# Patient Record
Sex: FEMALE | Race: WHITE | Employment: FULL TIME | URBAN - METROPOLITAN AREA
[De-identification: names, ages, dates, MRNs, and addresses within clinical notes are randomized per-mention and may not be internally consistent; named-entity substitution may affect disease eponyms.]

---

## 2018-07-16 PROBLEM — Z34.90 PREGNANCY: Status: ACTIVE | Noted: 2018-07-16

## 2018-07-19 PROBLEM — O09.899 RUBELLA NON-IMMUNE STATUS, ANTEPARTUM: Status: ACTIVE | Noted: 2018-07-19

## 2018-07-19 PROBLEM — Z28.39 RUBELLA NON-IMMUNE STATUS, ANTEPARTUM: Status: ACTIVE | Noted: 2018-07-19

## 2019-01-26 ENCOUNTER — ANESTHESIA EVENT (OUTPATIENT)
Dept: LABOR AND DELIVERY | Age: 29
End: 2019-01-26
Payer: COMMERCIAL

## 2019-01-26 ENCOUNTER — ANESTHESIA (OUTPATIENT)
Dept: LABOR AND DELIVERY | Age: 29
End: 2019-01-26
Payer: COMMERCIAL

## 2019-01-26 ENCOUNTER — HOSPITAL ENCOUNTER (INPATIENT)
Age: 29
LOS: 2 days | Discharge: HOME | End: 2019-01-28
Attending: OBSTETRICS & GYNECOLOGY | Admitting: OBSTETRICS & GYNECOLOGY
Payer: COMMERCIAL

## 2019-01-26 PROBLEM — O42.00 PREMATURE RUPTURE OF MEMBRANES WITH ONSET OF LABOR WITHIN 24 HOURS OF RUPTURE: Status: ACTIVE | Noted: 2019-01-26

## 2019-01-26 PROBLEM — O26.893 ABDOMINAL PAIN DURING PREGNANCY, THIRD TRIMESTER: Status: ACTIVE | Noted: 2019-01-26

## 2019-01-26 PROBLEM — R10.9 ABDOMINAL PAIN DURING PREGNANCY, THIRD TRIMESTER: Status: ACTIVE | Noted: 2019-01-26

## 2019-01-26 LAB
A1 MICROGLOB PLACENTAL VAG QL: POSITIVE
ALBUMIN SERPL-MCNC: 2.5 G/DL (ref 3.5–5)
ALBUMIN/GLOB SERPL: 0.7 {RATIO}
ALP SERPL-CCNC: 221 U/L (ref 50–136)
ALT SERPL-CCNC: 22 U/L (ref 12–65)
ANION GAP SERPL CALC-SCNC: 8 MMOL/L
ARTERIAL PATENCY WRIST A: ABNORMAL
ARTERIAL PATENCY WRIST A: ABNORMAL
AST SERPL-CCNC: 27 U/L (ref 15–37)
BASE DEFICIT BLD-SCNC: 7 MMOL/L
BASE DEFICIT BLD-SCNC: 8 MMOL/L
BDY SITE: ABNORMAL
BDY SITE: ABNORMAL
BILIRUB SERPL-MCNC: 0.4 MG/DL (ref 0.2–1.1)
BODY TEMPERATURE: 98.6
BODY TEMPERATURE: 98.6
BUN SERPL-MCNC: 6 MG/DL (ref 6–23)
CALCIUM SERPL-MCNC: 8.7 MG/DL (ref 8.3–10.4)
CHLORIDE SERPL-SCNC: 107 MMOL/L (ref 98–107)
CO2 BLD-SCNC: 22 MMOL/L
CO2 BLD-SCNC: 26 MMOL/L
CO2 SERPL-SCNC: 23 MMOL/L (ref 21–32)
COLLECT TIME,HTIME: 2239
COLLECT TIME,HTIME: 2239
CONTROL LINE PRESENT?: NORMAL
CREAT SERPL-MCNC: 0.65 MG/DL (ref 0.6–1)
ERYTHROCYTE [DISTWIDTH] IN BLOOD BY AUTOMATED COUNT: 13.5 % (ref 11.9–14.6)
EXPIRATION DATE: NORMAL
GAS FLOW.O2 O2 DELIVERY SYS: ABNORMAL L/MIN
GAS FLOW.O2 O2 DELIVERY SYS: ABNORMAL L/MIN
GLOBULIN SER CALC-MCNC: 3.8 G/DL (ref 2.3–3.5)
GLUCOSE SERPL-MCNC: 75 MG/DL (ref 65–100)
HCO3 BLD-SCNC: 23.5 MMOL/L (ref 22–26)
HCO3 BLDV-SCNC: 20.4 MMOL/L (ref 23–28)
HCT VFR BLD AUTO: 39.7 % (ref 35.8–46.3)
HGB BLD-MCNC: 12.5 G/DL (ref 11.7–15.4)
INTERNAL NEGATIVE CONTROL: NORMAL
KIT LOT NO.: NORMAL
MCH RBC QN AUTO: 28 PG (ref 26.1–32.9)
MCHC RBC AUTO-ENTMCNC: 31.5 G/DL (ref 31.4–35)
MCV RBC AUTO: 88.8 FL (ref 79.6–97.8)
NRBC # BLD: 0 K/UL (ref 0–0.2)
O2/TOTAL GAS SETTING VFR VENT: 21 %
O2/TOTAL GAS SETTING VFR VENT: 21 %
PCO2 BLDCO: 45 MMHG (ref 32–68)
PCO2 BLDCO: 74 MMHG (ref 32–68)
PH BLDCO: 7.11 [PH] (ref 7.15–7.38)
PH BLDCO: 7.26 [PH] (ref 7.15–7.38)
PLATELET # BLD AUTO: 199 K/UL (ref 150–450)
PMV BLD AUTO: 11.4 FL (ref 9.4–12.3)
PO2 BLDCO: 26 MMHG
PO2 BLDCO: 29 MMHG
POTASSIUM SERPL-SCNC: 4.2 MMOL/L (ref 3.5–5.1)
PROT SERPL-MCNC: 6.3 G/DL
RBC # BLD AUTO: 4.47 M/UL (ref 4.05–5.2)
SAO2 % BLD: 29 % (ref 95–98)
SAO2 % BLDV: 46 % (ref 65–88)
SERVICE CMNT-IMP: ABNORMAL
SODIUM SERPL-SCNC: 138 MMOL/L (ref 136–145)
SPECIMEN TYPE: ABNORMAL
SPECIMEN TYPE: ABNORMAL
URATE SERPL-MCNC: 5.5 MG/DL (ref 2.6–6)
WBC # BLD AUTO: 12.3 K/UL (ref 4.3–11.1)

## 2019-01-26 PROCEDURE — 85027 COMPLETE CBC AUTOMATED: CPT

## 2019-01-26 PROCEDURE — 77030011945 HC CATH URIN INT ST MENT -A

## 2019-01-26 PROCEDURE — 84550 ASSAY OF BLOOD/URIC ACID: CPT

## 2019-01-26 PROCEDURE — 4A1HXCZ MONITORING OF PRODUCTS OF CONCEPTION, CARDIAC RATE, EXTERNAL APPROACH: ICD-10-PCS | Performed by: OBSTETRICS & GYNECOLOGY

## 2019-01-26 PROCEDURE — 77030011943

## 2019-01-26 PROCEDURE — A4300 CATH IMPL VASC ACCESS PORTAL: HCPCS | Performed by: ANESTHESIOLOGY

## 2019-01-26 PROCEDURE — 86900 BLOOD TYPING SEROLOGIC ABO: CPT

## 2019-01-26 PROCEDURE — 74011250637 HC RX REV CODE- 250/637: Performed by: OBSTETRICS & GYNECOLOGY

## 2019-01-26 PROCEDURE — 77030003028 HC SUT VCRL J&J -A

## 2019-01-26 PROCEDURE — 36415 COLL VENOUS BLD VENIPUNCTURE: CPT

## 2019-01-26 PROCEDURE — 82803 BLOOD GASES ANY COMBINATION: CPT

## 2019-01-26 PROCEDURE — 74011250636 HC RX REV CODE- 250/636: Performed by: OBSTETRICS & GYNECOLOGY

## 2019-01-26 PROCEDURE — 0KQM0ZZ REPAIR PERINEUM MUSCLE, OPEN APPROACH: ICD-10-PCS | Performed by: OBSTETRICS & GYNECOLOGY

## 2019-01-26 PROCEDURE — 76060000078 HC EPIDURAL ANESTHESIA

## 2019-01-26 PROCEDURE — 84112 EVAL AMNIOTIC FLUID PROTEIN: CPT | Performed by: OBSTETRICS & GYNECOLOGY

## 2019-01-26 PROCEDURE — 75410000002 HC LABOR FEE PER 1 HR

## 2019-01-26 PROCEDURE — 77030014125 HC TY EPDRL BBMI -B: Performed by: ANESTHESIOLOGY

## 2019-01-26 PROCEDURE — 99284 EMERGENCY DEPT VISIT MOD MDM: CPT

## 2019-01-26 PROCEDURE — 80053 COMPREHEN METABOLIC PANEL: CPT

## 2019-01-26 PROCEDURE — 77030018846 HC SOL IRR STRL H20 ICUM -A

## 2019-01-26 PROCEDURE — 74011250636 HC RX REV CODE- 250/636

## 2019-01-26 PROCEDURE — 75410000003 HC RECOV DEL/VAG/CSECN EA 0.5 HR

## 2019-01-26 PROCEDURE — 65270000029 HC RM PRIVATE

## 2019-01-26 PROCEDURE — 74011000250 HC RX REV CODE- 250

## 2019-01-26 PROCEDURE — 75410000000 HC DELIVERY VAGINAL/SINGLE

## 2019-01-26 RX ORDER — SODIUM CHLORIDE 0.9 % (FLUSH) 0.9 %
5-40 SYRINGE (ML) INJECTION AS NEEDED
Status: DISCONTINUED | OUTPATIENT
Start: 2019-01-26 | End: 2019-01-27

## 2019-01-26 RX ORDER — ROPIVACAINE HYDROCHLORIDE 2 MG/ML
INJECTION, SOLUTION EPIDURAL; INFILTRATION; PERINEURAL AS NEEDED
Status: DISCONTINUED | OUTPATIENT
Start: 2019-01-26 | End: 2019-01-26 | Stop reason: HOSPADM

## 2019-01-26 RX ORDER — MINERAL OIL
120 OIL (ML) ORAL
Status: COMPLETED | OUTPATIENT
Start: 2019-01-26 | End: 2019-01-26

## 2019-01-26 RX ORDER — ACETAMINOPHEN 10 MG/ML
1000 INJECTION, SOLUTION INTRAVENOUS ONCE
Status: COMPLETED | OUTPATIENT
Start: 2019-01-26 | End: 2019-01-27

## 2019-01-26 RX ORDER — ROPIVACAINE HYDROCHLORIDE 2 MG/ML
INJECTION, SOLUTION EPIDURAL; INFILTRATION; PERINEURAL
Status: DISCONTINUED | OUTPATIENT
Start: 2019-01-26 | End: 2019-01-26 | Stop reason: HOSPADM

## 2019-01-26 RX ORDER — OXYTOCIN/RINGER'S LACTATE 30/500 ML
0-20 PLASTIC BAG, INJECTION (ML) INTRAVENOUS
Status: DISCONTINUED | OUTPATIENT
Start: 2019-01-26 | End: 2019-01-27

## 2019-01-26 RX ORDER — BUTORPHANOL TARTRATE 2 MG/ML
1 INJECTION INTRAMUSCULAR; INTRAVENOUS
Status: DISCONTINUED | OUTPATIENT
Start: 2019-01-26 | End: 2019-01-27 | Stop reason: HOSPADM

## 2019-01-26 RX ORDER — LIDOCAINE HYDROCHLORIDE AND EPINEPHRINE 15; 5 MG/ML; UG/ML
INJECTION, SOLUTION EPIDURAL AS NEEDED
Status: DISCONTINUED | OUTPATIENT
Start: 2019-01-26 | End: 2019-01-26 | Stop reason: HOSPADM

## 2019-01-26 RX ORDER — LIDOCAINE HYDROCHLORIDE 10 MG/ML
1 INJECTION INFILTRATION; PERINEURAL
Status: DISCONTINUED | OUTPATIENT
Start: 2019-01-26 | End: 2019-01-27 | Stop reason: HOSPADM

## 2019-01-26 RX ORDER — LIDOCAINE HYDROCHLORIDE 20 MG/ML
JELLY TOPICAL
Status: DISCONTINUED | OUTPATIENT
Start: 2019-01-26 | End: 2019-01-27 | Stop reason: HOSPADM

## 2019-01-26 RX ORDER — SODIUM CHLORIDE 0.9 % (FLUSH) 0.9 %
5-40 SYRINGE (ML) INJECTION EVERY 8 HOURS
Status: DISCONTINUED | OUTPATIENT
Start: 2019-01-26 | End: 2019-01-27

## 2019-01-26 RX ORDER — IBUPROFEN 800 MG/1
800 TABLET ORAL
Status: DISCONTINUED | OUTPATIENT
Start: 2019-01-26 | End: 2019-01-28 | Stop reason: HOSPADM

## 2019-01-26 RX ORDER — DEXTROSE, SODIUM CHLORIDE, SODIUM LACTATE, POTASSIUM CHLORIDE, AND CALCIUM CHLORIDE 5; .6; .31; .03; .02 G/100ML; G/100ML; G/100ML; G/100ML; G/100ML
125 INJECTION, SOLUTION INTRAVENOUS CONTINUOUS
Status: DISCONTINUED | OUTPATIENT
Start: 2019-01-26 | End: 2019-01-27

## 2019-01-26 RX ORDER — HYDROCODONE BITARTRATE AND ACETAMINOPHEN 5; 325 MG/1; MG/1
1 TABLET ORAL
Status: DISCONTINUED | OUTPATIENT
Start: 2019-01-26 | End: 2019-01-28 | Stop reason: HOSPADM

## 2019-01-26 RX ORDER — OXYTOCIN/RINGER'S LACTATE 15/250 ML
250 PLASTIC BAG, INJECTION (ML) INTRAVENOUS ONCE
Status: ACTIVE | OUTPATIENT
Start: 2019-01-26 | End: 2019-01-27

## 2019-01-26 RX ADMIN — SODIUM CHLORIDE, SODIUM LACTATE, POTASSIUM CHLORIDE, CALCIUM CHLORIDE, AND DEXTROSE MONOHYDRATE 125 ML/HR: 600; 310; 30; 20; 5 INJECTION, SOLUTION INTRAVENOUS at 22:16

## 2019-01-26 RX ADMIN — MINERAL OIL 120 ML: 471.95 OIL ORAL at 21:00

## 2019-01-26 RX ADMIN — ROPIVACAINE HYDROCHLORIDE 10 ML/HR: 2 INJECTION, SOLUTION EPIDURAL; INFILTRATION; PERINEURAL at 16:59

## 2019-01-26 RX ADMIN — ROPIVACAINE HYDROCHLORIDE 5 ML: 2 INJECTION, SOLUTION EPIDURAL; INFILTRATION; PERINEURAL at 16:59

## 2019-01-26 RX ADMIN — BUTORPHANOL TARTRATE 1 MG: 2 INJECTION, SOLUTION INTRAMUSCULAR; INTRAVENOUS at 15:52

## 2019-01-26 RX ADMIN — LIDOCAINE HYDROCHLORIDE AND EPINEPHRINE 5 ML: 15; 5 INJECTION, SOLUTION EPIDURAL at 16:55

## 2019-01-26 RX ADMIN — ACETAMINOPHEN 1000 MG: 10 INJECTION, SOLUTION INTRAVENOUS at 20:35

## 2019-01-26 NOTE — PROGRESS NOTES
Pt has been admitted to 432 for PROM. Clear fluids. Pt has decided that she is breastfeeding her infant. Plan of care discussed with pt for prima  labor and what to expect. Pain control options discussed and SVE at this time 3-4/80/.

## 2019-01-26 NOTE — ANESTHESIA PROCEDURE NOTES
Epidural Block Start time: 1/26/2019 4:49 PM 
End time: 1/26/2019 4:59 PM 
Performed by: Lavonne Bernheim, MD 
Authorized by: Lavonne Bernheim, MD  
 
Pre-Procedure Indication: labor epidural   
Preanesthetic Checklist: patient identified, risks and benefits discussed, anesthesia consent, patient being monitored, timeout performed and anesthesia consent Timeout Time: 16:49 Epidural:  
Patient position:  Seated Prep region:  Lumbar Prep: Chlorhexidine Location:  L3-4 Needle and Epidural Catheter:  
Needle Type:  Tuohy Needle Gauge:  17 G Injection Technique:  Loss of resistance using air Attempts:  1 Catheter Size:  19 G Catheter at Skin Depth (cm):  15.5 Depth in Epidural Space (cm):  7 Events: no blood with aspiration, no cerebrospinal fluid with aspiration, no paresthesia and negative aspiration test   
Test Dose:  Lidocaine 1.5% w/ epi and negative Assessment:  
Catheter Secured:  Tape and tegaderm Insertion:  Uncomplicated Patient tolerance:  Patient tolerated the procedure well with no immediate complications

## 2019-01-26 NOTE — H&P
History & Physical 
 
Name: Dionne Obando MRN: [id number]  SSN: [id number] YOB: 1990  Age: 29 y.o. Sex: female Chief Complaint Patient presents with  Rupture of Membranes 39 Subjective:  
 
Estimated Date of Delivery: 19 OB History  Para Term  AB Living 1 SAB TAB Ectopic Molar Multiple Live Births # Outcome Date GA Lbr Sean/2nd Weight Sex Delivery Anes PTL Lv  
1 Current Ms. Argelia Kaplan is seen with pregnancy at 36w1d for Presumptive ROM . Prenatal course was normal. Had gush of fluid at 3 am, contractions since 3 am, first 7 minutes apart, now 5 minutes. the patients states that the baby moves as usual 
 Please see prenatal records for details. Past Medical History:  
Diagnosis Date  Abnormal Papanicolaou smear of cervix   
 followed by repeat paps.  Pneumonia 2013 Pneumonia & Flu Past Surgical History:  
Procedure Laterality Date  HX OTHER SURGICAL    
 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  
 HX WISDOM TEETH EXTRACTION Social History Occupational History  Not on file Tobacco Use  Smoking status: Former Smoker Packs/day: 0.50 Years: 4.00 Pack years: 2.00  Smokeless tobacco: Never Used Substance and Sexual Activity  Alcohol use: No  
  Comment: Socially  Drug use: No  
 Sexual activity: Yes  
  Partners: Male Birth control/protection: None Family History Problem Relation Age of Onset  Hypertension Mother Allergies Allergen Reactions  Sulfa (Sulfonamide Antibiotics) Rash At age 15 Prior to Admission medications Medication Sig Start Date End Date Taking? Authorizing Provider  
prenatal vit 91/iron/folic/dha (PRENATAL + DHA PO) Take  by mouth. Yes Provider, Historical  
  
 
Review of Systems: 
Constitutional:No headache, fever Cardiac:   No chest pain Resp: No cough or shortness of breath GI:   No nausea/vomiting, diarrhea, abdominal pain :   No dysuria Neuro:     No vision changes, headache Objective:  
 
Vitals: 
Vitals:  
 19 1152 Weight: 108.9 kg (240 lb) Physical Exam: 
Patient without distress. Heart: Regular rate and rhythm Lung: clear to auscultation throughout lung fields, no wheezes, no rales, no rhonchi and normal respiratory effort Back: costovertebral angle tenderness absent Abdomen: soft, nontender, without guarding, without rebound Fundus: soft and non tender Cervical Exam: 3 cm dilated 80% effaced 0 station Presenting Part: cephalic Cervical Position: posterior Lower Extremities:  - Edema 2+ Membranes:  Spontaneous Rupture of Membranes; Amniotic Fluid: clear fluid Fetal Heart Rate tracing: Category 1 Uterine contractions: not tracing. Prenatal Labs:  
Lab Results Component Value Date/Time  
 Rubella, External Non-Immune 2018 HBsAg, External Negative 2018 HIV, External NR 2018 RPR, External NR 2018 Gonorrhea, External negative 2018 Chlamydia, External negative 2018 Assessment/Plan:  
 
Ms. Clemencia Venegas is a  seen with pregnancy at 36w1d for Presumptive ROM . Plan:  
 
Admit for labor management Patient discussed with Dr. Nellie Amaro MD

## 2019-01-26 NOTE — ANESTHESIA PREPROCEDURE EVALUATION
Anesthetic History No history of anesthetic complications Review of Systems / Medical History Patient summary reviewed, nursing notes reviewed and pertinent labs reviewed Pulmonary Within defined limits Neuro/Psych Within defined limits Cardiovascular Within defined limits Exercise tolerance: >4 METS 
  
GI/Hepatic/Renal 
Within defined limits Endo/Other Morbid obesity Other Findings Physical Exam 
 
Airway Mallampati: II 
 
 
Mouth opening: Normal 
 
 Cardiovascular Regular rate and rhythm,  S1 and S2 normal,  no murmur, click, rub, or gallop Dental 
No notable dental hx Pulmonary Breath sounds clear to auscultation Abdominal 
 
 
 
 Other Findings Anesthetic Plan ASA: 3 Anesthesia type: epidural 
 
 
 
 
 
Anesthetic plan and risks discussed with: Patient and Spouse

## 2019-01-27 LAB
ABO + RH BLD: NORMAL
BLOOD GROUP ANTIBODIES SERPL: NORMAL
SPECIMEN EXP DATE BLD: NORMAL

## 2019-01-27 PROCEDURE — 65270000029 HC RM PRIVATE

## 2019-01-27 PROCEDURE — 74011250637 HC RX REV CODE- 250/637: Performed by: OBSTETRICS & GYNECOLOGY

## 2019-01-27 RX ORDER — SIMETHICONE 80 MG
80 TABLET,CHEWABLE ORAL
Status: DISCONTINUED | OUTPATIENT
Start: 2019-01-27 | End: 2019-01-28 | Stop reason: HOSPADM

## 2019-01-27 RX ADMIN — HYDROCODONE BITARTRATE AND ACETAMINOPHEN 1 TABLET: 5; 325 TABLET ORAL at 06:18

## 2019-01-27 RX ADMIN — WITCH HAZEL 1 PAD: 500 SOLUTION RECTAL; TOPICAL at 06:18

## 2019-01-27 RX ADMIN — IBUPROFEN 800 MG: 800 TABLET ORAL at 09:29

## 2019-01-27 RX ADMIN — HYDROCODONE BITARTRATE AND ACETAMINOPHEN 1 TABLET: 5; 325 TABLET ORAL at 09:29

## 2019-01-27 RX ADMIN — IBUPROFEN 800 MG: 800 TABLET ORAL at 01:06

## 2019-01-27 RX ADMIN — IBUPROFEN 800 MG: 800 TABLET ORAL at 19:11

## 2019-01-27 RX ADMIN — HYDROCODONE BITARTRATE AND ACETAMINOPHEN 1 TABLET: 5; 325 TABLET ORAL at 19:11

## 2019-01-27 NOTE — PROGRESS NOTES
Labor Progress Note Patient seen, fetal heart rate and contraction pattern evaluated, patient examined. Pt complete and starting to push. Feels slight pain or pressure. Patient Vitals for the past 1 hrs: 
 BP Temp Pulse 01/26/19 2034 118/62  98  
01/26/19 2030  99.6 °F (37.6 °C)  Physical Exam: 
Cervical Exam:  10 cm dilated 100% effaced +2 station Presenting Part: cephalic - baby could be OP - attempted to rotate but pelvis tight Membranes:  S/p prom Uterine Activity: Frequency: Every 1-4 minutes Fetal Heart Rate: Reactive Assessment/Plan: 
Reassuring fetal status, Continue plan for vaginal delivery. Continue pushing. Axillary temp 99.6. Tylenol given. Likely developing chorio. nicu aware.

## 2019-01-27 NOTE — PROGRESS NOTES
Dr. Hayley Yan updated on patient. Temp 99.6 axillary, skin feels warm. Patient nauseated, so given 1 gram of ofirmiv IV. Will continue to monitor the patient.

## 2019-01-27 NOTE — PROGRESS NOTES
01/27/19 0030 Straight Cath Straight Cath Nurse performed cath Number of Attempts 1 Catheter Size 16 FR Urine 1000 mL Patient  Able to ambulate, unable to void at this time. I&O cath for 1000 at [facility].

## 2019-01-27 NOTE — PROGRESS NOTES
Post-Partum Day Number 1 Progress Note Patient doing well post-partum without significant complaint. Voiding without difficulty, normal lochia. Vitals:   
Patient Vitals for the past 8 hrs: 
 BP Temp Pulse Resp SpO2  
19 0753 122/65 98.2 °F (36.8 °C) 88 18 95 % 19 0200 128/71 97.9 °F (36.6 °C) 72 18 97 % Temp (24hrs), Av.6 °F (37 °C), Min:97.8 °F (36.6 °C), Max:99.6 °F (37.6 °C) Vital signs stable, afebrile. Exam:  Patient without distress. Abdomen soft, fundus firm at level of umbilicus, nontender Perineum with normal lochia noted. Lower extremities are negative for swelling, cords or tenderness. Lab/Data Review: CBC:  
Lab Results Component Value Date/Time WBC 12.3 (H) 2019 02:48 PM  
 HGB 12.5 2019 02:48 PM  
 HCT 39.7 2019 02:48 PM  
  2019 02:48 PM  
 
 
Assessment and Plan:  Patient appears to be having uncomplicated post-partum course. Continue routine perineal care and maternal education. Plan discharge tomorrow if no problems occur.

## 2019-01-27 NOTE — L&D DELIVERY NOTE
Delivery Summary    Patient: Arlene Dawson MRN: [id number]  SSN: [id number]    YOB: 1990  Age: 29 y.o. Sex: female       Head delivered over perineal laceration with delivery of anterior shoulder and body to follow. Bulb suction of mouth and nose on field. Cord clamped and cut and handed to awaiting nursing staff. Placenta expressed with fundus firm and manually explored and noted to be free of placenta. 2nd degree laceration repaired using 2-0 / 3-0 vicryl with excellent hemostasis. Mother and baby doing well. Baby went to NICU. Information for the patient's :  Coffeycandelaria Cuevas [[id number]]       Labor Events:    Labor: No   Rupture Date:     Rupture Time:     Rupture Type     Amniotic Fluid Volume:      Amniotic Fluid Description: Clear None   Induction: None       Augmentation: Oxytocin   Labor Events: None     Cervical Ripening:     None     Delivery Events:  Episiotomy: None   Laceration(s): Second degree perineal     Repaired: Yes    Number of Repair Packets: 2   Suture Type and Size: Vicryl 3-0  Vicryl 2-0     Estimated Blood Loss (ml): 250ml       Delivery Date: 2019    Delivery Time: 10:39 PM  Delivery Type: Vaginal, Spontaneous  Sex:  Male     Gestational Age: 43w3d   Delivery Clinician:  Fabiola Cook  Living Status: Living   Delivery Location: [facility]          APGARS  One minute Five minutes Ten minutes   Skin color: 1   1        Heart rate: 2   2        Grimace: 2   2        Muscle tone: 0   1        Breathin   2        Totals: 7   8            Presentation: Vertex    Position:   Occiput Anterior  Resuscitation Method:        Meconium Stained:        Cord Information: 3 Vessels  Complications: None  Cord around:    Delayed cord clamping?  No  Cord clamped date/time:   Disposition of Cord Blood:      Blood Gases Sent?: Yes    Placenta:  Date/Time: 2019 10:41 PM  Removal: Spontaneous      Appearance: Normal      Measurements:  Birth Weight: 5 lb 14.5 oz (2.68 kg)      Birth Length: 1' 7.29\" (0.49 m)      Head Circumference: 1' (0.305 m)      Chest Circumference: 11.5\" (0.292 m)     Abdominal Girth: Other Providers:   MARCELL PEREZ;RINA SIEGEL;USHA CARMONA;NEW [place];OLIVIA SHERIFF, Obstetrician;Primary Nurse;Primary  Nurse;Neonatologist;Anesthesiologist;Crna;Nurse Practitioner;Midwife;Nursery Nurse           Group B Strep: No results found for: GRBSEXT, GRBSEXT  Information for the patient's :  Khoa Arauz [[id number]]   No results found for: ABORH, PCTABR, PCTDIG, BILI, ABORHEXT, ABORH    No results for input(s): PCO2CB, PO2CB, HCO3I, SO2I, IBD, PTEMPI, SPECTI, PHICB, ISITE, IDEV, IALLEN in the last 72 hours.

## 2019-01-27 NOTE — ANESTHESIA POSTPROCEDURE EVALUATION
* No procedures listed *. Anesthesia Post Evaluation Multimodal analgesia: multimodal analgesia used between 6 hours prior to anesthesia start to PACU discharge Patient location during evaluation: bedside Patient participation: complete - patient participated Level of consciousness: awake and alert Pain management: adequate Airway patency: patent Anesthetic complications: no 
Cardiovascular status: acceptable Respiratory status: nonlabored ventilation and acceptable Hydration status: acceptable Post anesthesia nausea and vomiting:  none Visit Vitals /71 (BP 1 Location: Left arm, BP Patient Position: At rest) Pulse 72 Temp 36.6 °C (97.9 °F) Resp 18 Wt 108.9 kg (240 lb) SpO2 97% Breastfeeding? Yes  
BMI 41.20 kg/m²

## 2019-01-27 NOTE — PROGRESS NOTES
SBAR OUT Report: Mother Verbal report given to Jim HORN RN (full name & credentials) on this patient, who is now being transferred to MIU (unit) for routine progression of care. The patient is not wearing a green \"Anesthesia-Duramorph\" band. Report consisted of patient's Situation, Background, Assessment and Recommendations (SBAR). Lutz ID bands were compared with the identification form, and verified with the patient and receiving nurse. Information from the SBAR, Kardex, Procedure Summary, Intake/Output, MAR, Accordion, Recent Results and Med Rec Status and the Andrea Report was reviewed with the receiving nurse; opportunity for questions and clarification provided. Fundus checked with Emily Rothman RN during bedside report. Firm, -1, scant rubra.

## 2019-01-27 NOTE — PROGRESS NOTES
01/26/19 1954 Cervical Exam  
Dilation (cm) 10 Eff 100 % Station 0 Vaginal exam done by?  Branden Schwartz, RN  
 
Dr. Bill Barrett updated on patient. Will let patient labor down for half a hour. Will continue to monitor the patient.

## 2019-01-27 NOTE — LACTATION NOTE
Met with mom in SCN. Lactation visit. First time mom. 39 week male infant, in SCN, early in first 25 hours of life. Now off o2. Will start PO formula this feed to wean off IV fluids. Had attempted at the breast 1 hour ago with RN, baby too sleepy, no latch. Mom has pumped x 3, drops only so far. Reviewed colostrum expectations. Reviewed need to pump every 3 hours. Discussed hands on pumping technique and also increasing pump suction to 3 drops, initiate phase. Mom agreeable. Baby sleeping now, skin to skin with mom. Will attempt at the breast every other feeding for tonight. Mom to pump every 3 hours. Reviewed pumping in SCN at infant bedside. Skin to skin as much as possible.  Lactation to continue to assist.

## 2019-01-28 VITALS
RESPIRATION RATE: 17 BRPM | SYSTOLIC BLOOD PRESSURE: 127 MMHG | TEMPERATURE: 98.3 F | HEART RATE: 84 BPM | OXYGEN SATURATION: 99 % | DIASTOLIC BLOOD PRESSURE: 62 MMHG | BODY MASS INDEX: 41.2 KG/M2 | WEIGHT: 240 LBS

## 2019-01-28 PROCEDURE — 74011250637 HC RX REV CODE- 250/637: Performed by: OBSTETRICS & GYNECOLOGY

## 2019-01-28 PROCEDURE — 74011250636 HC RX REV CODE- 250/636: Performed by: OBSTETRICS & GYNECOLOGY

## 2019-01-28 PROCEDURE — 90715 TDAP VACCINE 7 YRS/> IM: CPT | Performed by: OBSTETRICS & GYNECOLOGY

## 2019-01-28 RX ORDER — IBUPROFEN 800 MG/1
800 TABLET ORAL
Qty: 60 TAB | Refills: 0 | Status: SHIPPED
Start: 2019-01-28 | End: 2019-03-04

## 2019-01-28 RX ADMIN — TETANUS TOXOID, REDUCED DIPHTHERIA TOXOID AND ACELLULAR PERTUSSIS VACCINE, ADSORBED 0.5 ML: 5; 2.5; 8; 8; 2.5 SUSPENSION INTRAMUSCULAR at 10:52

## 2019-01-28 RX ADMIN — IBUPROFEN 800 MG: 800 TABLET ORAL at 16:42

## 2019-01-28 NOTE — PROGRESS NOTES
Shift assessment complete as noted. Patient request pain medicine. Questions encouraged and answered. Encouraged to call for needs or concerns. Verbalizes understanding.

## 2019-01-28 NOTE — DISCHARGE SUMMARY
Obstetrical Discharge Summary     Name: Lizzeth Abarca MRN: [id number]  SSN: [id number]    YOB: 1990  Age: 29 y.o. Sex: female      Allergies: Sulfa (sulfonamide antibiotics)    Admit Date: 2019    Discharge Date: 2019     Admitting Physician: Dee Dee Olvera MD     Attending Physician:  Jessy Gotti DO     * Admission Diagnoses: SROM;Abdominal pain during pregnancy, third trimester    * Discharge Diagnoses:   Information for the patient's :  Bri Andres [[id number]]   Delivery of a 5 lb 14.5 oz (2.68 kg) male infant via Vaginal, Spontaneous on 2019 at 10:39 PM  by . Apgars were 7 and 8.        Additional Diagnoses:   Hospital Problems as of 2019 Date Reviewed: 2019          Codes Class Noted - Resolved POA    Abdominal pain during pregnancy, third trimester ICD-10-CM: O26.893, R10.9  ICD-9-CM: 646.83, 789.00  2019 - Present Unknown        * (Principal) Premature rupture of membranes with onset of labor within 24 hours of rupture ICD-10-CM: O42.00  ICD-9-CM: 658.10  2019 - Present Unknown             Lab Results   Component Value Date/Time    ABO/Rh(D) O POSITIVE 2019 02:48 PM    Rubella, External Non-Immune 2018    ABO,Rh O positive 2018      Immunization History   Administered Date(s) Administered    Tdap 2019       * Procedures: vaginal delivery  * No surgery found *      Weir  Depression Scale  I have been able to laugh and see the funny side of things: As much as I always could  I have looked forward with enjoyment to things: As much as I ever did  I have blamed myself unnecessarily when things went wrong: Not very often  I have been anxious or worried for no good reason: No, not at all  I have felt scared or panicky for no very good reason: No, not at all  Things have been getting on top of me: No, I have been coping as well as ever  I have been so unhappy that I have had difficulty sleeping: No, not at all  I have felt sad or miserable: No, not at all  I have been so unhappy that I have been crying: No, never  The thought of harming myself has occurred to me: Never  Total Score: 1    * Discharge Condition: good    * Hospital Course: Normal hospital course following the delivery. * Disposition: Home    Discharge Medications:   Current Discharge Medication List      START taking these medications    Details   ibuprofen (MOTRIN) 800 mg tablet Take 1 Tab by mouth every six (6) hours as needed. Qty: 60 Tab, Refills: 0         CONTINUE these medications which have NOT CHANGED    Details   prenatal vit 91/iron/folic/dha (PRENATAL + DHA PO) Take  by mouth. * Follow-up Care/Patient Instructions:   Activity: No sex for 6 weeks, No driving while on analgesics and No heavy lifting for 4 weeks  Diet: Regular Diet  Wound Care: Keep wound clean and dry    Follow-up Information     Follow up With Specialties Details Why Contact Dee Rincon DO Obstetrics & Gynecology, Gynecology, Obstetrics   [address] [address] [address] [facility]  [phone]             Signed By:  Michele Langford MD     January 28, 2019 - - -

## 2019-01-28 NOTE — PROGRESS NOTES
Teaching for self care reviewed. Discharge instructions reviewed and E-signed. Copy given to pt. No printed prescriptions (Motrin e-scribed). Reviewed follow up appointment for self. Questions encouraged and answered. Infant in NCU. Instructed to call when ready for discharge.

## 2019-01-28 NOTE — PROGRESS NOTES
Chart reviewed due to first time parent and baby in NICU (36w1d).  met with family and provided education on [facility] Post[facility] [place] [facility] Visit Program.  Family declined referral for home visit. Patient states that she's coping \"fine\" with baby's premature delivery and admission to the NICU. Patient has consistent transportation to/from hospital.  Patient denies any history of depression/anxiety. Patient states that she has a car seat, crib, and all needed items to care for .  provided informational packet on  mood disorder education/resources. Family receptive to receiving information and denied any additional needs from . Family has this 's contact information should any needs/questions arise. Katt Feng, Wilian [address]

## 2019-01-28 NOTE — PROGRESS NOTES
Post-Partum Day Number 2 Progress/Discharge Note Patient doing well post-partum without significant complaint. Voiding without difficulty, normal lochia, positive flatus. Vitals:   
Patient Vitals for the past 8 hrs: 
 BP Temp Pulse Resp SpO2  
19 0656 127/62 98.3 °F (36.8 °C) 84 17 99 % Temp (24hrs), Av.3 °F (36.8 °C), Min:98.2 °F (36.8 °C), Max:98.3 °F (36.8 °C) Vital signs stable, afebrile. Exam:  Patient without distress. Abdomen soft, fundus firm at level of umbilicus, non tender Lower extremities are negative for swelling, cords or tenderness. Lab/Data Review: All lab results for the last 24 hours reviewed. Assessment and Plan:  Patient appears to be having uncomplicated post-partum course. Continue routine perineal care and maternal education. Plan discharge for today with follow up in our office in 2 weeks.

## 2019-01-28 NOTE — DISCHARGE INSTRUCTIONS
DISCHARGE SUMMARY from Nurse    PATIENT INSTRUCTIONS:    What to do at Home:  Recommended activity: Discharge instruction to follow: Activity: Pelvis rest for 6 weeks     No heavy lifting over 15 lbs for 2 weeks     No driving for 2 weeks     No push/pull motion such as sweeping or vacuuming for 2 weeks     No tub baths for 6 weeks    If using sitz bath continue until comfortable stopping. If using helena-bottle continue to use until comfortable stopping. Change sanitary pad after each urination or bowel movement. Call MD for the following:      Fever over 101 F; pain not relieved by medication; foul smelling vaginal discharge or an increase in vaginal bleeding. Take medication as prescribed. Follow up with MD as ordered. *  Please give a list of your current medications to your Primary Care Provider. *  Please update this list whenever your medications are discontinued, doses are      changed, or new medications (including over-the-counter products) are added. *  Please carry medication information at all times in case of emergency situations. These are general instructions for a healthy lifestyle:    No smoking/ No tobacco products/ Avoid exposure to second hand smoke  Surgeon General's Warning:  Quitting smoking now greatly reduces serious risk to your health. Obesity, smoking, and sedentary lifestyle greatly increases your risk for illness    A healthy diet, regular physical exercise & weight monitoring are important for maintaining a healthy lifestyle    You may be retaining fluid if you have a history of heart failure or if you experience any of the following symptoms:  Weight gain of 3 pounds or more overnight or 5 pounds in a week, increased swelling in our hands or feet or shortness of breath while lying flat in bed. Please call your doctor as soon as you notice any of these symptoms; do not wait until your next office visit.     Recognize signs and symptoms of STROKE:    F-face looks uneven    A-arms unable to move or move unevenly    S-speech slurred or non-existent    T-time-call 911 as soon as signs and symptoms begin-DO NOT go       Back to bed or wait to see if you get better-TIME IS BRAIN. Warning Signs of HEART ATTACK     Call 911 if you have these symptoms:   Chest discomfort. Most heart attacks involve discomfort in the center of the chest that lasts more than a few minutes, or that goes away and comes back. It can feel like uncomfortable pressure, squeezing, fullness, or pain.  Discomfort in other areas of the upper body. Symptoms can include pain or discomfort in one or both arms, the back, neck, jaw, or stomach.  Shortness of breath with or without chest discomfort.  Other signs may include breaking out in a cold sweat, nausea, or lightheadedness. Don't wait more than five minutes to call 911 - MINUTES MATTER! Fast action can save your life. Calling 911 is almost always the fastest way to get lifesaving treatment. Emergency Medical Services staff can begin treatment when they arrive -- up to an hour sooner than if someone gets to the hospital by car. The discharge information has been reviewed with the patient. The patient verbalized understanding. Discharge medications reviewed with the patient and appropriate educational materials and side effects teaching were provided. ___________________________________________________________________________________________________________________________________  Patient Education        Vaginal Childbirth: Care Instructions  Your Care Instructions    Your body will slowly heal in the next few weeks. It is easy to get too tired and overwhelmed during the first weeks after your baby is born. Changes in your hormones can shift your mood without warning. You may find it hard to meet the extra demands on your energy and time. Take it easy on yourself. Follow-up care is a key part of your treatment and safety.  Be sure to make and go to all appointments, and call your doctor if you are having problems. It's also a good idea to know your test results and keep a list of the medicines you take. How can you care for yourself at home? · Vaginal bleeding and cramps  ? After delivery, you will have a bloody discharge from the vagina. This will turn pink within a week and then white or yellow after about 10 days. It may last for 2 to 4 weeks or longer, until the uterus has healed. Use pads instead of tampons until you stop bleeding. ? Do not worry if you pass some blood clots, as long as they are smaller than a golf ball. If you have a tear or stitches in your vaginal area, change the pad at least every 4 hours to prevent soreness and infection. ? You may have cramps for the first few days after childbirth. These are normal and occur as the uterus shrinks to normal size. Take an over-the-counter pain medicine, such as acetaminophen (Tylenol), ibuprofen (Advil, Motrin), or naproxen (Aleve), for cramps. Read and follow all instructions on the label. Do not take aspirin, because it can cause more bleeding. ? Do not take two or more pain medicines at the same time unless the doctor told you to. Many pain medicines have acetaminophen, which is Tylenol. Too much acetaminophen (Tylenol) can be harmful. · Stitches  ? If you have stitches, they will dissolve on their own and do not need to be removed. Follow your doctor's instructions for cleaning the stitched area. ? Put ice or a cold pack on your painful area for 10 to 20 minutes at a time, several times a day, for the first few days. Put a thin cloth between the ice and your skin. ? Sit in a few inches of warm water (sitz bath) 3 times a day and after bowel movements. The warm water helps with pain and itching. If you do not have a tub, a warm shower might help. · Breast fullness  ? Your breasts may overfill (engorge) in the first few days after delivery.  To help milk flow and to relieve pain, warm your breasts in the shower or by using warm, moist towels before nursing. ? If you are not nursing, do not put warmth on your breasts or touch your breasts. Wear a tight bra or sports bra and use ice until the fullness goes away. This usually takes 2 to 3 days. ? Put ice or a cold pack on your breast after nursing to reduce swelling and pain. Put a thin cloth between the ice and your skin. · Activity  ? Eat a balanced diet. Do not try to lose weight by cutting calories. Keep taking your prenatal vitamins, or take a multivitamin. ? Get as much rest as you can. Try to take naps when your baby sleeps during the day. ? Get some exercise every day. But do not do any heavy exercise until your doctor says it is okay. ? Wait until you are healed (about 4 to 6 weeks) before you have sexual intercourse. Your doctor will tell you when it is okay to have sex. ? Talk to your doctor about birth control. You can get pregnant even before your period returns. Also, you can get pregnant while you are breastfeeding. · Mental health  ? It is normal to have some sadness, anxiety, sleeplessness, and mood swings after you go home. If you feel upset or hopeless for more than a few days or are having trouble doing the things you need to do, talk to your doctor. · Constipation and hemorrhoids  ? Drink plenty of fluids, enough so that your urine is light yellow or clear like water. If you have kidney, heart, or liver disease and have to limit fluids, talk with your doctor before you increase the amount of fluids you drink. ? Eat plenty of fiber each day. Have a bran muffin or bran cereal for breakfast, and try eating a piece of fruit for a mid-afternoon snack. ? For painful, itchy hemorrhoids, put ice or a cold pack on the area several times a day for 10 minutes at a time. Follow this by putting a warm compress on the area for another 10 to 20 minutes or by sitting in a shallow, warm bath.   When should you call for help?  Call 911 anytime you think you may need emergency care. For example, call if:    · You passed out (lost consciousness).    Call your doctor now or seek immediate medical care if:    · You have severe vaginal bleeding.     · You are dizzy or lightheaded, or you feel like you may faint.     · You have a fever.     · You have new or more pain in your belly or pelvis.    Watch closely for changes in your health, and be sure to contact your doctor if:    · Your vaginal bleeding seems to be getting heavier.     · You have new or worse vaginal discharge.     · You feel sad, anxious, or hopeless for more than a few days.     · You do not get better as expected. Where can you learn more? Go to http://paul-zeferino.info/. Enter Z077 in the search box to learn more about \"Vaginal Childbirth: Care Instructions. \"  Current as of: September 5, 2018  Content Version: 11.9  © 7861-0982 [employer]. Care instructions adapted under license by [employer] (which disclaims liability or warranty for this information). If you have questions about a medical condition or this instruction, always ask your healthcare professional. [place] [address] any warranty or liability for your use of this information.

## 2019-01-28 NOTE — PROGRESS NOTES
01/28/19 1647 Pain Assessment Pain Scale 1 Numeric (0 - 10) Pain Intensity 1 6 Pain Location 1 Abdomen;Perineum Pain Orientation 1 Lower Pain Description 1 Sore;Cramping Pain Intervention(s) 1 Medication (see MAR)

## 2019-01-28 NOTE — LACTATION NOTE
In to see mom prior to discharge to home. She continues to pump and had expressed 6 ml at her last pumping. She rented a hospital grade breast pump. I reviewed with her how to use the pump as well as her beast pump kit. Instructed her to take her pump kit home with her. She has syringes and labels for use at home. Encouraged her to request lactation consultant as needed.

## 2019-02-11 PROBLEM — E66.01 SEVERE OBESITY (HCC): Status: ACTIVE | Noted: 2019-02-11

## 2019-03-04 PROBLEM — Z34.90 PREGNANCY: Status: RESOLVED | Noted: 2018-07-16 | Resolved: 2019-03-04

## 2019-09-18 NOTE — PROGRESS NOTES
09/18/19 1000   CM/SW Screening   Referral Source    Information Source Chart review   Patient's Mental Status Alert;Oriented   Patient's [address]   Patient Status Prior to Admission   Independent with ADLs and Mobility Yes   D Labor and delivery made aware of pt .